# Patient Record
(demographics unavailable — no encounter records)

---

## 2024-11-06 NOTE — HISTORY OF PRESENT ILLNESS
[Former] : Former [TextBox_13] : Mr. HOFFMAN is a 71 year old male with a history of CAD, HTN, high cholesterol, COPD and melanoma. Reviewed and confirmed that the patient meets screening eligibility criteria: 71 years old Smoking Status: Former smoker Number of pack(s) per day: 3/4 Number of years smoked: 50 Number of pack years smokin.5 Quit year:  No symptoms of lung cancer, including new cough, change in cough, hemoptysis, and unintentional weight loss. No personal history of lung cancer. No lung cancer in a first degree relative. No history of occupational exposures.   [YearQuit] : 2020 [PacksperYear] : 38

## 2024-12-19 NOTE — HISTORY OF PRESENT ILLNESS
[FreeTextEntry1] : 63 yo male former 30 pack year smoker (DC in ) evaluated for a 3 mm RML lung nodule note on CT done on 16. The patient is a survivor of metastatic melanoma The patient's father  of melanoma and his sisters had breast cancer The patient denies, chest pain, fever, cough, sputum, sweats, weight loss, orthopnea, edema, or dyspnea. He has no pertinent exposure history  20 No cough, sweat, weight loss or SANCHEZ Rides bicycle 15 Miles per day No active chest pain or heartburn   20 Rows and bikes Down near ideal weight No complaints   21 Rows and bikes - foot injury recently - will restart soon No cough, sweat, weight loss or SANCHEZ   22 Mostly riding motor cycle Feels well Gained a few lbs  23 Active No cough, sputum, weight loss or dyspnea   24 Less Active No cough, sputum, weight loss or dyspnea

## 2024-12-19 NOTE — RESULTS/DATA
[TextEntry] : LDCT on 8/16/23: Unchanged micronodules  LDCT 11/25/24: Impression: No new, enlarging or suspicious pulmonary nodule. Unchanged 0.4 cm right fissure-based lymph node or nodule. Unchanged lingula solid pulmonary nodules measuring up to 0.4 cm. LungRADS Category: 2 Benign. Recommendation: 12 month Low Dose Chest CT

## 2024-12-19 NOTE — PHYSICAL EXAM
[General Appearance - Well Developed] : well developed [Normal Oropharynx] : normal oropharynx [Neck Appearance] : the appearance of the neck was normal [] : the neck was supple [Neck Cervical Mass (___cm)] : no neck mass was observed [Jugular Venous Distention Increased] : there was no jugular-venous distention [Thyroid Diffuse Enlargement] : the thyroid was not enlarged [Heart Sounds] : normal S1 and S2 [Arterial Pulses Normal] : the arterial pulses were normal [Edema] : no peripheral edema present [Respiration, Rhythm And Depth] : normal respiratory rhythm and effort [Auscultation Breath Sounds / Voice Sounds] : lungs were clear to auscultation bilaterally [Lungs Percussion] : the lungs were normal to percussion [Nail Clubbing] : no clubbing of the fingernails [Cyanosis, Localized] : no localized cyanosis [FreeTextEntry1] : overweight

## 2024-12-19 NOTE — DISCUSSION/SUMMARY
[FreeTextEntry1] : Assessment     Unlikely to have a pulmonary malignancy Fulfills criteria for yearly LDCT lung cancer screening through 2027 Nothing to suggest COPD.  Plan  GERD measures   LDCT lung cancer screening yearly in November through 2027 Yearly FU in December through 2027